# Patient Record
Sex: FEMALE | Race: WHITE | Employment: OTHER | URBAN - NONMETROPOLITAN AREA
[De-identification: names, ages, dates, MRNs, and addresses within clinical notes are randomized per-mention and may not be internally consistent; named-entity substitution may affect disease eponyms.]

---

## 2020-01-28 ENCOUNTER — OFFICE VISIT (OUTPATIENT)
Dept: OTOLARYNGOLOGY | Age: 65
End: 2020-01-28
Payer: COMMERCIAL

## 2020-01-28 VITALS
HEIGHT: 61 IN | OXYGEN SATURATION: 95 % | RESPIRATION RATE: 20 BRPM | HEART RATE: 64 BPM | BODY MASS INDEX: 31.96 KG/M2 | SYSTOLIC BLOOD PRESSURE: 132 MMHG | DIASTOLIC BLOOD PRESSURE: 75 MMHG | TEMPERATURE: 98.8 F | WEIGHT: 169.3 LBS

## 2020-01-28 PROCEDURE — 99203 OFFICE O/P NEW LOW 30 MIN: CPT | Performed by: PHYSICIAN ASSISTANT

## 2020-01-28 RX ORDER — ATORVASTATIN CALCIUM 20 MG/1
20 TABLET, FILM COATED ORAL DAILY
COMMUNITY
Start: 2019-11-26 | End: 2020-12-21 | Stop reason: SDUPTHER

## 2020-01-28 RX ORDER — MULTIVIT WITH MINERALS/LUTEIN
1000 TABLET ORAL DAILY
COMMUNITY

## 2020-01-28 RX ORDER — CALCIUM/MAGNESIUM/ZINC 333-133 MG
TABLET ORAL DAILY
COMMUNITY

## 2020-01-28 RX ORDER — CITALOPRAM 40 MG/1
TABLET ORAL
COMMUNITY
Start: 2020-01-15 | End: 2021-02-26

## 2020-01-28 ASSESSMENT — ENCOUNTER SYMPTOMS
CHOKING: 0
VOMITING: 0
STRIDOR: 0
SHORTNESS OF BREATH: 0
EYE REDNESS: 0
SINUS PAIN: 0
BACK PAIN: 1
COUGH: 1
PHOTOPHOBIA: 1
WHEEZING: 0
EYE DISCHARGE: 0
EYE PAIN: 0
ANAL BLEEDING: 0
NAUSEA: 0
ABDOMINAL DISTENTION: 0
TROUBLE SWALLOWING: 0
RHINORRHEA: 0
ABDOMINAL PAIN: 0
CHEST TIGHTNESS: 0
SINUS PRESSURE: 0
SORE THROAT: 1
APNEA: 0
BLOOD IN STOOL: 0
EYE ITCHING: 0
CONSTIPATION: 0
FACIAL SWELLING: 0
RECTAL PAIN: 0
COLOR CHANGE: 0
VOICE CHANGE: 1
DIARRHEA: 0

## 2020-01-28 NOTE — PROGRESS NOTES
frequently. She indicates having the same symptoms (cough and sore throat) \"last year\". She denies being diagnosed with bronchitis around the time symptoms started. No travel in the past year. Severity:   Denies having an active sore throat. No pain anywhere today. Associated symptoms/other symptoms:  Denies associated fever. Denies nasal drainage. Nasal congestion resolved. Denies itchy nose and eyes. Denies frequent sneezing. Says hearing is good. No drainage from her ears. No ear pressure currently. Resolved ear pain. Had been getting lump(s) on right side of neck and under chin. Lumps/bumps gone now. Denies chest congestion. Resolved \"bloating\" since making dietary changes. \"Occasionally\" within the past couple of months has had reflux/heartburn that would be triggered by drinking or eating certain things (like caffeinated tea that had [place]) so has been avoiding obvious triggers with improvement in symptoms. Reports intentional weight loss due to intentional dietary changes. Denies abdominal pain. Usually voice changes if coughing, but can also have without the coughing. Denies active hoarseness. Dental pain denied. Teeth in good condition per Pt. Does report posterior neck pain. Denies arm weakness, numbness or tingling. Risk factors/other information:   Hx of tonsillectomy. Alcohol use? Yes. Wine \"hardly at all\". Not on a daily or weekly basis. Former smoker. Duration at current residence x 40+ years. No pets in the home. 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 Indicates there is adequate ventilation, but no open windows. No mask worn, but doesn't notice symptoms in this setting. Since works in a medical office does come in contact with sick people. Denies Hx of environmental or seasonal allergies (not even as a kid). 2 EGDs in the past because of acid reflux.   Waits several hours after History    Not on file   Social Needs    Financial resource strain: Not on file    Food insecurity:     Worry: Not on file     Inability: Not on file    Transportation needs:     Medical: Not on file     Non-medical: Not on file   Tobacco Use    Smoking status: Former Smoker     Types: Cigarettes     Last attempt to quit: 1988     Years since quittin.0    Smokeless tobacco: Never Used   Substance and Sexual Activity    Alcohol use: Yes     Comment: occas    Drug use: Not on file    Sexual activity: Not on file   Lifestyle    Physical activity:     Days per week: Not on file     Minutes per session: Not on file    Stress: Not on file   Relationships    Social connections:     Talks on phone: Not on file     Gets together: Not on file     Attends Che[employer] service: Not on file     Active member of club or organization: Not on file     Attends meetings of clubs or organizations: Not on file     Relationship status: Not on file    Intimate partner violence:     Fear of current or ex partner: Not on file     Emotionally abused: Not on file     Physically abused: Not on file     Forced sexual activity: Not on file   Other Topics Concern    Not on file   Social History Narrative    Not on file     Family History   Problem Relation Age of Onset    Diabetes Mother     Heart Disease Father         PHYSICAL EXAM:    The patient was examined today 2020 with findings as follows:    CONSTITUTIONAL:    General Appearance: well-appearing, nontoxic, alert, no acute distress     Communication: understanding at normal conversational tones, normal voicing (no hoarseness or weakness), speech intelligible    HEAD/FACE:    Head: atraumatic, normocephalic    Facial Inspection: no lesions, healthy skin    Facial Strength: motor strength normal, symmetric strength, symmetric movement    Sinuses: no sinus tenderness (frontal, maxillary, ethmoid)    Salivary Glands: no enlargement of parotid glands, no tenderness of parotid glands, no masses of parotid glands, no enlargement of submandibular glands, no tenderness of submandibular glands, no masses of submandibular glands    Temporomandibular Joint: no crepitus with motion, no tenderness on palpation, no trismus, motion symmetric    EYES:  PERRL, extra-ocular movements intact, no nystagmus, sclera white, no redness of eyes, no watering of eyes    EARS:    Bilateral External Ears: no pits, no tags    Right External Ear: normally formed, no lesions; no mastoid tenderness, redness or swelling    Left External Ear: normally formed, no lesions; no mastoid tenderness, redness or swelling    Right External Auditory Canal: normally formed, no redness, no swelling, no lesions, healthy skin, no obstructing cerumen, no discharge    Left External Auditory Canal: normally formed, no redness, no swelling, no lesions, healthy skin, no obstructing cerumen, no discharge    Right Tympanic Membrane: normal landmarks, translucent, immobile to pneumatic otoscopy, no perforation, no effusion    Left Tympanic Membrane: normal landmarks, translucent, immobile to pneumatic otoscopy, no perforation, no effusion    Hearing: intact to spoken voice    NOSE:    Nasal Skin: no lesions, no lacerations, no scars    Nasal Dorsum: symmetric with no visible or palpable deformities    Nasal Tip: normal symmetric nasal tip, normal nasal valves    Nasal Mucosa: normal, pink and moist, no drainage, no polyps    Septum: not markedly deformed, mild deviation to the right, no exposed vessels, no bleeding, no septal granuloma, no perforation    Turbinates: normal size and conformation, no swelling    ORAL CAVITY/MOUTH:    Lips, teeth, gums: normal lips, normal gums, dentition intact, no dental pain on palpation    Oral Mucosa: normal, moist, no lesions    Palate: normal hard palate, normal soft palate, symmetric palatal elevation    Floor of Mouth: normal floor of mouth    Tongue: normal tongue, no lesions, no edema, no masses, normal mucosa, mobile    Tonsils: absent    Posterior pharynx: normal, no masses or lesions, no erythema, no PND, no exudate    HYPOPHARYNX/LARYNX (indirect mirror exam):  Hypopharynx: normal hypopharynx, normal tongue base, normal vallecula    Larynx: normal epiglottis, normal false vocal cords, normal true vocal cords but the anterior most portion of vocal cords not visualized on indirect mirror exam, normal glottic mobility, no arytenoid edema, no post-cricoid edema, no subglottic stenosis  Some arytenoid redness, but no swelling. NECK:    Neck: no masses, trachea midline, functional active range of motion, no cysts or pits, no tenderness to palpation    Thyroid: normal thyroid, no enlargement, no tenderness, no nodules    LYMPH NODES:    Cervical: no palpable lymph node enlargement    RESPIRATORY:    Inspection/Auscultation: good air movement (anteriorly, laterally, posteriorly), chest expands symmetrically, normal breath sounds, no wheezing, no stridor, no rhonchi, no crackles    CARDIOVASCULAR SYSTEM:  Heart regular rate and rhythm, normal S1 and S2, no m/r/g  No carotid thrills, no carotid bruits    Observation/Palpation of Peripheral Vascular System:  no edema    SKIN:    General Appearance:  warm and dry    NEUROLOGICAL SYSTEM:    Orientation: oriented to time, oriented to place, oriented to person, oriented to situation    Cranial Nerves: Cranial Nerves II-XII intact, normal facial movement    PSYCHIATRIC:    Mood and affect:  Affect appropriate for situation/setting. Assessment and Plan:  Pt to come back if having active symptoms. Pt indicates she almost cancelled today's appointment because her symptoms have improved.     In regards to reported reflux symptoms the patient and/or caregiver is advised on symptom management with the use of prescribed medication (not prescribed today), weight loss (Pt is working on this), dietary changes (Pt has been working on this) with the

## 2020-01-28 NOTE — PROGRESS NOTES
Review of Systems   Constitutional: Negative for activity change, appetite change, chills, diaphoresis, fatigue, fever and unexpected weight change. HENT: Positive for sore throat and voice change. Negative for congestion, dental problem, drooling, ear discharge, ear pain, facial swelling, hearing loss, mouth sores, nosebleeds, postnasal drip, rhinorrhea, sinus pressure, sinus pain, sneezing, tinnitus and trouble swallowing. Eyes: Positive for photophobia and visual disturbance. Negative for pain, discharge, redness and itching. Respiratory: Positive for cough. Negative for apnea, choking, chest tightness, shortness of breath, wheezing and stridor. Cardiovascular: Negative for chest pain, palpitations and leg swelling. Gastrointestinal: Negative for abdominal distention, abdominal pain, anal bleeding, blood in stool, constipation, diarrhea, nausea, rectal pain and vomiting. Endocrine: Negative for cold intolerance, heat intolerance, polydipsia, polyphagia and polyuria. Genitourinary: Negative for decreased urine volume, difficulty urinating, dyspareunia, dysuria, enuresis, flank pain, frequency, genital sores, hematuria, menstrual problem, pelvic pain, urgency, vaginal bleeding, vaginal discharge and vaginal pain. Musculoskeletal: Positive for back pain and neck pain. Negative for arthralgias, gait problem, joint swelling, myalgias and neck stiffness. Skin: Negative for color change, pallor, rash and wound. Allergic/Immunologic: Negative for environmental allergies, food allergies and immunocompromised state. Neurological: Negative for dizziness, tremors, seizures, syncope, facial asymmetry, speech difficulty, weakness, light-headedness, numbness and headaches. Hematological: Positive for adenopathy. Does not bruise/bleed easily.    Psychiatric/Behavioral: Negative for agitation, behavioral problems, confusion, decreased concentration, dysphoric mood, hallucinations, self-injury, sleep disturbance and suicidal ideas. The patient is not nervous/anxious and is not hyperactive.

## 2020-02-03 PROBLEM — K21.9 GASTROESOPHAGEAL REFLUX DISEASE: Status: ACTIVE | Noted: 2020-02-03

## 2020-08-12 ENCOUNTER — HOSPITAL ENCOUNTER (OUTPATIENT)
Dept: WOMENS IMAGING | Age: 65
Discharge: HOME | End: 2020-08-14
Payer: COMMERCIAL

## 2020-08-12 PROCEDURE — 77063 BREAST TOMOSYNTHESIS BI: CPT

## 2021-08-02 PROBLEM — I10 ESSENTIAL HYPERTENSION: Status: ACTIVE | Noted: 2021-08-02

## 2021-08-02 PROBLEM — E78.5 HYPERLIPIDEMIA: Status: ACTIVE | Noted: 2021-08-02

## 2021-12-15 ENCOUNTER — HOSPITAL ENCOUNTER (OUTPATIENT)
Dept: WOMENS IMAGING | Age: 66
Discharge: HOME | End: 2021-12-17
Payer: MEDICARE

## 2021-12-15 DIAGNOSIS — Z12.31 BREAST CANCER SCREENING BY MAMMOGRAM: ICD-10-CM

## 2021-12-15 PROCEDURE — 77063 BREAST TOMOSYNTHESIS BI: CPT

## 2023-02-24 ENCOUNTER — HOSPITAL ENCOUNTER (OUTPATIENT)
Dept: WOMENS IMAGING | Age: 68
End: 2023-02-24
Payer: MEDICARE

## 2023-02-24 DIAGNOSIS — Z12.31 BREAST CANCER SCREENING BY MAMMOGRAM: ICD-10-CM

## 2023-02-24 PROCEDURE — 77067 SCR MAMMO BI INCL CAD: CPT

## 2024-03-12 ENCOUNTER — HOSPITAL ENCOUNTER (OUTPATIENT)
Dept: WOMENS IMAGING | Age: 69
Discharge: HOME | End: 2024-03-14
Payer: COMMERCIAL

## 2024-03-12 VITALS — HEIGHT: 62 IN | BODY MASS INDEX: 27.05 KG/M2 | WEIGHT: 147 LBS

## 2024-03-12 DIAGNOSIS — Z12.39 SCREENING BREAST EXAMINATION: ICD-10-CM

## 2024-03-12 PROCEDURE — 77067 SCR MAMMO BI INCL CAD: CPT

## 2025-03-20 ENCOUNTER — HOSPITAL ENCOUNTER (OUTPATIENT)
Dept: WOMENS IMAGING | Age: 70
Discharge: HOME | End: 2025-03-22
Payer: MEDICARE

## 2025-03-20 DIAGNOSIS — Z12.31 VISIT FOR SCREENING MAMMOGRAM: ICD-10-CM

## 2025-03-20 PROBLEM — G89.29 CHRONIC LOW BACK PAIN WITHOUT SCIATICA: Status: ACTIVE | Noted: 2025-03-20

## 2025-03-20 PROBLEM — M15.0 PRIMARY OSTEOARTHRITIS INVOLVING MULTIPLE JOINTS: Status: ACTIVE | Noted: 2025-03-20

## 2025-03-20 PROBLEM — R73.9 HYPERGLYCEMIA: Status: ACTIVE | Noted: 2025-03-20

## 2025-03-20 PROBLEM — M54.50 CHRONIC LOW BACK PAIN WITHOUT SCIATICA: Status: ACTIVE | Noted: 2025-03-20

## 2025-03-20 PROCEDURE — 77063 BREAST TOMOSYNTHESIS BI: CPT
